# Patient Record
Sex: MALE | URBAN - METROPOLITAN AREA
[De-identification: names, ages, dates, MRNs, and addresses within clinical notes are randomized per-mention and may not be internally consistent; named-entity substitution may affect disease eponyms.]

---

## 2022-11-30 ENCOUNTER — OFFICE (OUTPATIENT)
Dept: URBAN - METROPOLITAN AREA CLINIC 105 | Facility: CLINIC | Age: 87
Setting detail: OPHTHALMOLOGY
End: 2022-11-30
Payer: COMMERCIAL

## 2022-11-30 DIAGNOSIS — H25.013: ICD-10-CM

## 2022-11-30 DIAGNOSIS — H40.1232: ICD-10-CM

## 2022-11-30 PROCEDURE — 92133 CPTRZD OPH DX IMG PST SGM ON: CPT | Performed by: OPHTHALMOLOGY

## 2022-11-30 PROCEDURE — 92012 INTRM OPH EXAM EST PATIENT: CPT | Performed by: OPHTHALMOLOGY

## 2022-11-30 ASSESSMENT — REFRACTION_AUTOREFRACTION
OD_AXIS: 081
OD_CYLINDER: -2.00
OD_SPHERE: +2.75
OS_AXIS: 095
OS_SPHERE: +1.50
OS_CYLINDER: -1.25

## 2022-11-30 ASSESSMENT — REFRACTION_CURRENTRX
OD_CYLINDER: -1.00
OS_SPHERE: +1.50
OD_VPRISM_DIRECTION: BF
OS_CYLINDER: -1.75
OD_OVR_VA: 20/
OS_VPRISM_DIRECTION: BF
OD_AXIS: 098
OS_ADD: +3.00
OD_SPHERE: +2.25
OS_OVR_VA: 20/
OD_ADD: +3.00
OS_AXIS: 097

## 2022-11-30 ASSESSMENT — CONFRONTATIONAL VISUAL FIELD TEST (CVF)
OS_FINDINGS: FULL
OD_FINDINGS: FULL

## 2022-11-30 ASSESSMENT — TONOMETRY
OS_IOP_MMHG: 11
OD_IOP_MMHG: 13

## 2022-11-30 ASSESSMENT — PACHYMETRY
OS_CT_CORRECTION: -4
OD_CT_CORRECTION: -2
OD_CT_UM: 570
OS_CT_UM: 590

## 2022-11-30 ASSESSMENT — SPHEQUIV_DERIVED
OD_SPHEQUIV: 1.75
OS_SPHEQUIV: 0.875

## 2022-11-30 ASSESSMENT — VISUAL ACUITY
OS_BCVA: 20/CF @2FT
OD_BCVA: 20/30-

## 2022-12-12 ENCOUNTER — OFFICE (OUTPATIENT)
Dept: URBAN - METROPOLITAN AREA CLINIC 105 | Facility: CLINIC | Age: 87
Setting detail: OPHTHALMOLOGY
End: 2022-12-12
Payer: COMMERCIAL

## 2022-12-12 DIAGNOSIS — H34.8110: ICD-10-CM

## 2022-12-12 PROCEDURE — 92134 CPTRZ OPH DX IMG PST SGM RTA: CPT | Performed by: OPHTHALMOLOGY

## 2022-12-12 PROCEDURE — 67210 TREATMENT OF RETINAL LESION: CPT | Performed by: OPHTHALMOLOGY

## 2022-12-12 ASSESSMENT — VISUAL ACUITY
OD_BCVA: 20/30-1
OS_BCVA: 20/CF @2FT

## 2022-12-12 ASSESSMENT — SPHEQUIV_DERIVED
OD_SPHEQUIV: 1.75
OS_SPHEQUIV: 0.875

## 2022-12-12 ASSESSMENT — KERATOMETRY
OD_K2POWER_DIOPTERS: 41.00
OS_K1POWER_DIOPTERS: 40.75
OS_AXISANGLE_DEGREES: 090
OD_K1POWER_DIOPTERS: 40.25
OD_AXISANGLE_DEGREES: 005
OS_K2POWER_DIOPTERS: 40.75

## 2022-12-12 ASSESSMENT — REFRACTION_CURRENTRX
OD_SPHERE: +2.25
OS_AXIS: 097
OS_ADD: +3.00
OS_OVR_VA: 20/
OS_VPRISM_DIRECTION: BF
OD_OVR_VA: 20/
OS_CYLINDER: -1.75
OD_VPRISM_DIRECTION: BF
OD_CYLINDER: -1.00
OS_SPHERE: +1.50
OD_AXIS: 098
OD_ADD: +3.00

## 2022-12-12 ASSESSMENT — REFRACTION_AUTOREFRACTION
OS_SPHERE: +1.50
OS_CYLINDER: -1.25
OD_AXIS: 081
OS_AXIS: 095
OD_CYLINDER: -2.00
OD_SPHERE: +2.75

## 2022-12-12 ASSESSMENT — CONFRONTATIONAL VISUAL FIELD TEST (CVF)
OD_FINDINGS: FULL
OS_FINDINGS: FULL

## 2022-12-12 ASSESSMENT — AXIALLENGTH_DERIVED
OD_AL: 23.9721
OS_AL: 24.2806

## 2023-01-09 ENCOUNTER — OFFICE (OUTPATIENT)
Dept: URBAN - METROPOLITAN AREA CLINIC 105 | Facility: CLINIC | Age: 88
Setting detail: OPHTHALMOLOGY
End: 2023-01-09
Payer: COMMERCIAL

## 2023-01-09 DIAGNOSIS — H34.8110: ICD-10-CM

## 2023-01-09 PROCEDURE — 67028 INJECTION EYE DRUG: CPT | Performed by: OPHTHALMOLOGY

## 2023-01-09 PROCEDURE — 92134 CPTRZ OPH DX IMG PST SGM RTA: CPT | Performed by: OPHTHALMOLOGY

## 2023-01-09 ASSESSMENT — REFRACTION_CURRENTRX
OS_ADD: +3.00
OS_SPHERE: +1.50
OD_AXIS: 098
OS_VPRISM_DIRECTION: BF
OS_AXIS: 097
OD_ADD: +3.00
OD_OVR_VA: 20/
OD_VPRISM_DIRECTION: BF
OS_OVR_VA: 20/
OD_SPHERE: +2.25
OD_CYLINDER: -1.00
OS_CYLINDER: -1.75

## 2023-01-09 ASSESSMENT — KERATOMETRY
OD_K2POWER_DIOPTERS: 41.00
OS_K2POWER_DIOPTERS: 40.75
OS_K1POWER_DIOPTERS: 40.75
OS_AXISANGLE_DEGREES: 090
OD_K1POWER_DIOPTERS: 40.25
OD_AXISANGLE_DEGREES: 005

## 2023-01-09 ASSESSMENT — VISUAL ACUITY
OD_BCVA: 20/40-1
OS_BCVA: 20/CF @2FT

## 2023-01-09 ASSESSMENT — CONFRONTATIONAL VISUAL FIELD TEST (CVF)
OS_FINDINGS: FULL
OD_FINDINGS: FULL

## 2023-01-09 ASSESSMENT — REFRACTION_AUTOREFRACTION
OD_AXIS: 081
OS_CYLINDER: -1.25
OD_SPHERE: +2.75
OS_AXIS: 095
OS_SPHERE: +1.50
OD_CYLINDER: -2.00

## 2023-01-09 ASSESSMENT — AXIALLENGTH_DERIVED
OD_AL: 23.9721
OS_AL: 24.2806

## 2023-01-09 ASSESSMENT — SPHEQUIV_DERIVED
OD_SPHEQUIV: 1.75
OS_SPHEQUIV: 0.875

## 2023-03-13 ENCOUNTER — OFFICE (OUTPATIENT)
Dept: URBAN - METROPOLITAN AREA CLINIC 105 | Facility: CLINIC | Age: 88
Setting detail: OPHTHALMOLOGY
End: 2023-03-13
Payer: COMMERCIAL

## 2023-03-13 DIAGNOSIS — H34.8110: ICD-10-CM

## 2023-03-13 PROCEDURE — 92134 CPTRZ OPH DX IMG PST SGM RTA: CPT | Performed by: OPHTHALMOLOGY

## 2023-03-13 PROCEDURE — 67028 INJECTION EYE DRUG: CPT | Performed by: OPHTHALMOLOGY

## 2023-03-13 ASSESSMENT — AXIALLENGTH_DERIVED
OS_AL: 24.2806
OD_AL: 23.9721

## 2023-03-13 ASSESSMENT — KERATOMETRY
OS_K2POWER_DIOPTERS: 40.75
OS_AXISANGLE_DEGREES: 090
OD_AXISANGLE_DEGREES: 005
OS_K1POWER_DIOPTERS: 40.75
OD_K2POWER_DIOPTERS: 41.00
OD_K1POWER_DIOPTERS: 40.25

## 2023-03-13 ASSESSMENT — REFRACTION_CURRENTRX
OD_ADD: +3.00
OS_SPHERE: +1.50
OS_CYLINDER: -1.75
OD_AXIS: 098
OS_VPRISM_DIRECTION: BF
OS_ADD: +3.00
OD_CYLINDER: -1.00
OD_VPRISM_DIRECTION: BF
OS_OVR_VA: 20/
OD_SPHERE: +2.25
OS_AXIS: 097
OD_OVR_VA: 20/

## 2023-03-13 ASSESSMENT — CONFRONTATIONAL VISUAL FIELD TEST (CVF)
OS_FINDINGS: FULL
OD_FINDINGS: FULL

## 2023-03-13 ASSESSMENT — SPHEQUIV_DERIVED
OS_SPHEQUIV: 0.875
OD_SPHEQUIV: 1.75

## 2023-03-13 ASSESSMENT — VISUAL ACUITY
OD_BCVA: 20/50-1
OS_BCVA: 20/CF @2FT

## 2023-03-13 ASSESSMENT — REFRACTION_AUTOREFRACTION
OS_AXIS: 095
OS_CYLINDER: -1.25
OS_SPHERE: +1.50
OD_SPHERE: +2.75
OD_CYLINDER: -2.00
OD_AXIS: 081

## 2023-03-27 ENCOUNTER — OFFICE (OUTPATIENT)
Dept: URBAN - METROPOLITAN AREA CLINIC 105 | Facility: CLINIC | Age: 88
Setting detail: OPHTHALMOLOGY
End: 2023-03-27
Payer: COMMERCIAL

## 2023-03-27 DIAGNOSIS — H34.8110: ICD-10-CM

## 2023-03-27 PROCEDURE — 67210 TREATMENT OF RETINAL LESION: CPT | Performed by: OPHTHALMOLOGY

## 2023-03-27 ASSESSMENT — SPHEQUIV_DERIVED
OS_SPHEQUIV: 0.875
OD_SPHEQUIV: 1.75

## 2023-03-27 ASSESSMENT — REFRACTION_AUTOREFRACTION
OD_SPHERE: +2.75
OD_CYLINDER: -2.00
OS_SPHERE: +1.50
OS_CYLINDER: -1.25
OD_AXIS: 081
OS_AXIS: 095

## 2023-03-27 ASSESSMENT — KERATOMETRY
OS_AXISANGLE_DEGREES: 090
OS_K2POWER_DIOPTERS: 40.75
OD_AXISANGLE_DEGREES: 005
OD_K1POWER_DIOPTERS: 40.25
OD_K2POWER_DIOPTERS: 41.00
OS_K1POWER_DIOPTERS: 40.75

## 2023-03-27 ASSESSMENT — VISUAL ACUITY
OD_BCVA: 20/40-1
OS_BCVA: 20/CF @2FT

## 2023-03-27 ASSESSMENT — AXIALLENGTH_DERIVED
OS_AL: 24.2806
OD_AL: 23.9721

## 2023-03-27 ASSESSMENT — CONFRONTATIONAL VISUAL FIELD TEST (CVF)
OD_FINDINGS: FULL
OS_FINDINGS: FULL

## 2023-05-22 ENCOUNTER — OFFICE (OUTPATIENT)
Dept: URBAN - METROPOLITAN AREA CLINIC 105 | Facility: CLINIC | Age: 88
Setting detail: OPHTHALMOLOGY
End: 2023-05-22
Payer: MEDICARE

## 2023-05-22 DIAGNOSIS — H34.8110: ICD-10-CM

## 2023-05-22 PROCEDURE — 92134 CPTRZ OPH DX IMG PST SGM RTA: CPT | Performed by: OPHTHALMOLOGY

## 2023-05-22 PROCEDURE — 67028 INJECTION EYE DRUG: CPT | Performed by: OPHTHALMOLOGY

## 2023-05-22 ASSESSMENT — KERATOMETRY
OD_K1POWER_DIOPTERS: 40.25
OS_K2POWER_DIOPTERS: 40.75
OD_AXISANGLE_DEGREES: 005
OD_K2POWER_DIOPTERS: 41.00
OS_AXISANGLE_DEGREES: 090
OS_K1POWER_DIOPTERS: 40.75

## 2023-05-22 ASSESSMENT — SPHEQUIV_DERIVED
OD_SPHEQUIV: 1.75
OS_SPHEQUIV: 0.875

## 2023-05-22 ASSESSMENT — VISUAL ACUITY
OD_BCVA: 20/40-1
OS_BCVA: CF @2FT

## 2023-05-22 ASSESSMENT — REFRACTION_AUTOREFRACTION
OS_SPHERE: +1.50
OD_AXIS: 081
OD_SPHERE: +2.75
OS_AXIS: 095
OD_CYLINDER: -2.00
OS_CYLINDER: -1.25

## 2023-05-22 ASSESSMENT — CONFRONTATIONAL VISUAL FIELD TEST (CVF)
OS_FINDINGS: FULL
OD_FINDINGS: FULL

## 2023-05-22 ASSESSMENT — AXIALLENGTH_DERIVED
OD_AL: 23.9721
OS_AL: 24.2806

## 2023-06-08 ENCOUNTER — OFFICE (OUTPATIENT)
Dept: URBAN - METROPOLITAN AREA CLINIC 105 | Facility: CLINIC | Age: 88
Setting detail: OPHTHALMOLOGY
End: 2023-06-08
Payer: MEDICARE

## 2023-06-08 DIAGNOSIS — H25.013: ICD-10-CM

## 2023-06-08 DIAGNOSIS — H40.1232: ICD-10-CM

## 2023-06-08 DIAGNOSIS — H40.033: ICD-10-CM

## 2023-06-08 PROCEDURE — 99024 POSTOP FOLLOW-UP VISIT: CPT | Performed by: REGISTERED NURSE

## 2023-06-08 PROCEDURE — 92133 CPTRZD OPH DX IMG PST SGM ON: CPT | Performed by: REGISTERED NURSE

## 2023-06-08 ASSESSMENT — KERATOMETRY
OD_K1POWER_DIOPTERS: 40.75
OS_K2POWER_DIOPTERS: 41.50
OD_K2POWER_DIOPTERS: 41.25
OD_AXISANGLE_DEGREES: 171
OS_AXISANGLE_DEGREES: 089
OS_K1POWER_DIOPTERS: 40.75

## 2023-06-08 ASSESSMENT — VISUAL ACUITY
OD_BCVA: 20/50-2
OS_BCVA: CF @2FT

## 2023-06-08 ASSESSMENT — SPHEQUIV_DERIVED
OD_SPHEQUIV: 2
OS_SPHEQUIV: 0.875

## 2023-06-08 ASSESSMENT — REFRACTION_AUTOREFRACTION
OS_AXIS: 096
OD_SPHERE: +2.75
OD_AXIS: 007
OD_CYLINDER: -1.50
OS_SPHERE: +1.50
OS_CYLINDER: -1.25

## 2023-06-08 ASSESSMENT — PACHYMETRY
OS_CT_CORRECTION: -4
OD_CT_CORRECTION: -2
OS_CT_UM: 590
OD_CT_UM: 570

## 2023-06-08 ASSESSMENT — AXIALLENGTH_DERIVED
OS_AL: 24.1356
OD_AL: 23.7318

## 2023-06-08 ASSESSMENT — TONOMETRY
OD_IOP_MMHG: 16
OS_IOP_MMHG: 14
OD_IOP_MMHG: 10
OS_IOP_MMHG: 09

## 2023-06-08 ASSESSMENT — CONFRONTATIONAL VISUAL FIELD TEST (CVF)
OD_FINDINGS: FULL
OS_FINDINGS: FULL

## 2023-06-16 ENCOUNTER — OFFICE (OUTPATIENT)
Dept: URBAN - METROPOLITAN AREA CLINIC 94 | Facility: CLINIC | Age: 88
Setting detail: OPHTHALMOLOGY
End: 2023-06-16
Payer: MEDICARE

## 2023-06-16 DIAGNOSIS — H25.012: ICD-10-CM

## 2023-06-16 DIAGNOSIS — H25.013: ICD-10-CM

## 2023-06-16 PROCEDURE — 99213 OFFICE O/P EST LOW 20 MIN: CPT | Performed by: OPHTHALMOLOGY

## 2023-06-16 PROCEDURE — 92136 OPHTHALMIC BIOMETRY: CPT | Performed by: OPHTHALMOLOGY

## 2023-06-16 ASSESSMENT — KERATOMETRY
OS_AXISANGLE_DEGREES: 156
OS_CYLPOWER_DEGREES: 0.5
OD_AXISANGLE_DEGREES: 169
OS_K1K2_AVERAGE: 40.5
OD_CYLAXISANGLE_DEGREES: 169
OS_K1POWER_DIOPTERS: 40.25
OD_K1K2_AVERAGE: 40.625
OD_AXISANGLE_DEGREES: 79
OD_K1POWER_DIOPTERS: 40.25
OD_K2POWER_DIOPTERS: 41.00
OD_K1POWER_DIOPTERS: 40.25
OD_AXISANGLE2_DEGREES: 169
OS_K2POWER_DIOPTERS: 40.75
OS_K1POWER_DIOPTERS: 40.25
OD_CYLPOWER_DEGREES: 0.75
OS_AXISANGLE2_DEGREES: 156
OS_K2POWER_DIOPTERS: 40.75
OD_K2POWER_DIOPTERS: 41.00
OS_CYLAXISANGLE_DEGREES: 156
OS_AXISANGLE_DEGREES: 66

## 2023-06-16 ASSESSMENT — PACHYMETRY
OD_CT_CORRECTION: -2
OD_CT_UM: 570
OS_CT_CORRECTION: -4
OS_CT_UM: 590

## 2023-06-16 ASSESSMENT — TONOMETRY
OS_IOP_MMHG: 16
OD_IOP_MMHG: 12

## 2023-06-16 ASSESSMENT — REFRACTION_AUTOREFRACTION
OS_SPHERE: +1.75
OD_SPHERE: +2.75
OS_AXIS: 098
OD_CYLINDER: -1.75
OS_CYLINDER: -1.25
OD_AXIS: 084

## 2023-06-16 ASSESSMENT — AXIALLENGTH_DERIVED
OD_AL: 23.922
OS_AL: 24.2747

## 2023-06-16 ASSESSMENT — CONFRONTATIONAL VISUAL FIELD TEST (CVF)
OS_FINDINGS: FULL
OD_FINDINGS: FULL

## 2023-06-16 ASSESSMENT — SPHEQUIV_DERIVED
OD_SPHEQUIV: 1.875
OS_SPHEQUIV: 1.125

## 2023-06-16 ASSESSMENT — VISUAL ACUITY
OS_BCVA: CF@5FT
OD_BCVA: 20/40-1

## 2023-08-03 ENCOUNTER — ASC (OUTPATIENT)
Dept: URBAN - METROPOLITAN AREA SURGERY 8 | Facility: SURGERY | Age: 88
Setting detail: OPHTHALMOLOGY
End: 2023-08-03
Payer: MEDICARE

## 2023-08-03 DIAGNOSIS — H52.212: ICD-10-CM

## 2023-08-03 DIAGNOSIS — H25.12: ICD-10-CM

## 2023-08-03 PROCEDURE — FEMTO FEMTOSECOND LASER: Performed by: OPHTHALMOLOGY

## 2023-08-03 PROCEDURE — 66984 XCAPSL CTRC RMVL W/O ECP: CPT | Performed by: OPHTHALMOLOGY

## 2023-08-04 ENCOUNTER — RX ONLY (RX ONLY)
Age: 88
End: 2023-08-04

## 2023-08-04 ENCOUNTER — OFFICE (OUTPATIENT)
Dept: URBAN - METROPOLITAN AREA CLINIC 94 | Facility: CLINIC | Age: 88
Setting detail: OPHTHALMOLOGY
End: 2023-08-04
Payer: MEDICARE

## 2023-08-04 DIAGNOSIS — Z96.1: ICD-10-CM

## 2023-08-04 DIAGNOSIS — H25.011: ICD-10-CM

## 2023-08-04 DIAGNOSIS — H25.013: ICD-10-CM

## 2023-08-04 DIAGNOSIS — H25.012: ICD-10-CM

## 2023-08-04 PROCEDURE — 99024 POSTOP FOLLOW-UP VISIT: CPT | Performed by: REGISTERED NURSE

## 2023-08-04 ASSESSMENT — PACHYMETRY
OD_CT_CORRECTION: -2
OD_CT_UM: 570
OS_CT_CORRECTION: -4
OS_CT_UM: 590

## 2023-08-04 ASSESSMENT — REFRACTION_AUTOREFRACTION
OD_SPHERE: +3.00
OS_CYLINDER: -1.75
OS_SPHERE: +0.25
OD_CYLINDER: -2.00
OS_AXIS: 143
OD_AXIS: 081

## 2023-08-04 ASSESSMENT — VISUAL ACUITY
OS_BCVA: 20/400
OD_BCVA: 20/50+1

## 2023-08-04 ASSESSMENT — KERATOMETRY
OS_AXISANGLE_DEGREES: 067
OD_K2POWER_DIOPTERS: 41.00
OS_K1POWER_DIOPTERS: 40.50
OD_K1POWER_DIOPTERS: 40.50
OS_K2POWER_DIOPTERS: 42.00
OD_AXISANGLE_DEGREES: 166

## 2023-08-04 ASSESSMENT — CONFRONTATIONAL VISUAL FIELD TEST (CVF)
OS_FINDINGS: FULL
OD_FINDINGS: FULL

## 2023-08-04 ASSESSMENT — SPHEQUIV_DERIVED
OD_SPHEQUIV: 2
OS_SPHEQUIV: -0.625

## 2023-08-04 ASSESSMENT — TONOMETRY
OS_IOP_MMHG: 15
OD_IOP_MMHG: 12

## 2023-08-04 ASSESSMENT — AXIALLENGTH_DERIVED
OS_AL: 24.7127
OD_AL: 23.8251

## 2023-08-04 ASSESSMENT — CORNEAL EDEMA - FOLDS/STRIAE: OS_FOLDSSTRIAE: 1+

## 2023-08-10 ENCOUNTER — OFFICE (OUTPATIENT)
Dept: URBAN - METROPOLITAN AREA CLINIC 105 | Facility: CLINIC | Age: 88
Setting detail: OPHTHALMOLOGY
End: 2023-08-10
Payer: MEDICARE

## 2023-08-10 DIAGNOSIS — Z96.1: ICD-10-CM

## 2023-08-10 PROCEDURE — 99024 POSTOP FOLLOW-UP VISIT: CPT | Performed by: REGISTERED NURSE

## 2023-08-10 ASSESSMENT — REFRACTION_AUTOREFRACTION
OS_AXIS: 157
OD_SPHERE: +3.25
OS_CYLINDER: -0.25
OD_CYLINDER: -2.25
OS_SPHERE: +0.25
OD_AXIS: 078

## 2023-08-10 ASSESSMENT — PACHYMETRY
OS_CT_UM: 590
OS_CT_CORRECTION: -4
OD_CT_CORRECTION: -2
OD_CT_UM: 570

## 2023-08-10 ASSESSMENT — VISUAL ACUITY
OS_BCVA: 20/400
OD_BCVA: 20/30-1

## 2023-08-10 ASSESSMENT — KERATOMETRY
OS_K2POWER_DIOPTERS: 42.50
OD_AXISANGLE_DEGREES: 176
OS_AXISANGLE_DEGREES: 086
OD_K2POWER_DIOPTERS: 41.25
OS_K1POWER_DIOPTERS: 41.00
OD_K1POWER_DIOPTERS: 40.75

## 2023-08-10 ASSESSMENT — AXIALLENGTH_DERIVED
OS_AL: 24.2013
OD_AL: 23.6826

## 2023-08-10 ASSESSMENT — CONFRONTATIONAL VISUAL FIELD TEST (CVF)
OS_FINDINGS: FULL
OD_FINDINGS: FULL

## 2023-08-10 ASSESSMENT — TONOMETRY
OS_IOP_MMHG: 18
OD_IOP_MMHG: 15

## 2023-08-10 ASSESSMENT — SPHEQUIV_DERIVED
OD_SPHEQUIV: 2.125
OS_SPHEQUIV: 0.125

## 2023-08-10 ASSESSMENT — CORNEAL EDEMA - FOLDS/STRIAE: OS_FOLDSSTRIAE: 1+

## 2023-08-28 ENCOUNTER — OFFICE (OUTPATIENT)
Dept: URBAN - METROPOLITAN AREA CLINIC 105 | Facility: CLINIC | Age: 88
Setting detail: OPHTHALMOLOGY
End: 2023-08-28
Payer: MEDICARE

## 2023-08-28 DIAGNOSIS — H34.8110: ICD-10-CM

## 2023-08-28 PROCEDURE — 92235 FLUORESCEIN ANGRPH MLTIFRAME: CPT | Performed by: OPHTHALMOLOGY

## 2023-08-28 PROCEDURE — 67028 INJECTION EYE DRUG: CPT | Performed by: OPHTHALMOLOGY

## 2023-08-28 PROCEDURE — 92134 CPTRZ OPH DX IMG PST SGM RTA: CPT | Performed by: OPHTHALMOLOGY

## 2023-08-28 ASSESSMENT — REFRACTION_AUTOREFRACTION
OS_CYLINDER: -0.25
OS_AXIS: 157
OD_AXIS: 078
OD_SPHERE: +3.25
OS_SPHERE: +0.25
OD_CYLINDER: -2.25

## 2023-08-28 ASSESSMENT — KERATOMETRY
OD_K2POWER_DIOPTERS: 41.25
OD_AXISANGLE_DEGREES: 176
OD_K1POWER_DIOPTERS: 40.75
OS_AXISANGLE_DEGREES: 086
OS_K2POWER_DIOPTERS: 42.50
OS_K1POWER_DIOPTERS: 41.00

## 2023-08-28 ASSESSMENT — CONFRONTATIONAL VISUAL FIELD TEST (CVF)
OS_FINDINGS: FULL
OD_FINDINGS: FULL

## 2023-08-28 ASSESSMENT — PACHYMETRY
OD_CT_UM: 570
OS_CT_CORRECTION: -4
OD_CT_CORRECTION: -2
OS_CT_UM: 590

## 2023-08-28 ASSESSMENT — TONOMETRY
OD_IOP_MMHG: 16
OS_IOP_MMHG: 14

## 2023-08-28 ASSESSMENT — VISUAL ACUITY
OS_BCVA: CF 5FT
OD_BCVA: 20/50-2

## 2023-08-28 ASSESSMENT — SPHEQUIV_DERIVED
OD_SPHEQUIV: 2.125
OS_SPHEQUIV: 0.125

## 2023-08-28 ASSESSMENT — AXIALLENGTH_DERIVED
OD_AL: 23.6826
OS_AL: 24.2013

## 2023-08-28 ASSESSMENT — CORNEAL EDEMA - FOLDS/STRIAE: OS_FOLDSSTRIAE: 1+

## 2023-09-06 ENCOUNTER — OFFICE (OUTPATIENT)
Dept: URBAN - METROPOLITAN AREA CLINIC 113 | Facility: CLINIC | Age: 88
Setting detail: OPHTHALMOLOGY
End: 2023-09-06
Payer: MEDICARE

## 2023-09-06 DIAGNOSIS — H26.492: ICD-10-CM

## 2023-09-06 DIAGNOSIS — Z96.1: ICD-10-CM

## 2023-09-06 PROCEDURE — 99024 POSTOP FOLLOW-UP VISIT: CPT | Performed by: OPHTHALMOLOGY

## 2023-09-06 ASSESSMENT — KERATOMETRY
OS_AXISANGLE_DEGREES: 077
OS_K1POWER_DIOPTERS: 40.00
OD_AXISANGLE_DEGREES: 166
OD_K2POWER_DIOPTERS: 40.75
OS_K2POWER_DIOPTERS: 41.75
OD_K1POWER_DIOPTERS: 40.50

## 2023-09-06 ASSESSMENT — VISUAL ACUITY
OD_BCVA: 20/60
OS_BCVA: CF 4FT

## 2023-09-06 ASSESSMENT — PACHYMETRY
OD_CT_UM: 570
OS_CT_CORRECTION: -4
OS_CT_UM: 590
OD_CT_CORRECTION: -2

## 2023-09-06 ASSESSMENT — REFRACTION_MANIFEST
OS_ADD: +2.75
OS_VA1: 20/60
OS_CYLINDER: -0.25
OS_SPHERE: PLANO
OS_VA2: 20/70(J7)
OS_AXIS: 135

## 2023-09-06 ASSESSMENT — TONOMETRY
OS_IOP_MMHG: 11
OD_IOP_MMHG: 16
OD_IOP_MMHG: 14
OS_IOP_MMHG: 12

## 2023-09-06 ASSESSMENT — REFRACTION_CURRENTRX
OD_ADD: +3.25
OS_CYLINDER: -1.75
OD_SPHERE: +2.25
OD_VPRISM_DIRECTION: SV
OD_CYLINDER: -1.00
OD_SPHERE: +2.75
OS_SPHERE: +1.50
OS_VPRISM_DIRECTION: SV
OS_SPHERE: +2.75
OD_AXIS: 101
OD_OVR_VA: 20/
OS_VPRISM_DIRECTION: BF
OD_VPRISM_DIRECTION: BF
OS_AXIS: 096
OS_ADD: +3.25
OD_OVR_VA: 20/
OS_OVR_VA: 20/
OS_OVR_VA: 20/

## 2023-09-06 ASSESSMENT — CONFRONTATIONAL VISUAL FIELD TEST (CVF)
OS_FINDINGS: FULL
OD_FINDINGS: FULL

## 2023-09-06 ASSESSMENT — REFRACTION_AUTOREFRACTION
OS_SPHERE: PLANO
OD_CYLINDER: -1.50
OD_AXIS: 090
OS_AXIS: 138
OD_SPHERE: +2.25
OS_CYLINDER: -0.50

## 2023-09-06 ASSESSMENT — AXIALLENGTH_DERIVED: OD_AL: 24.0731

## 2023-09-06 ASSESSMENT — CORNEAL EDEMA - FOLDS/STRIAE: OS_FOLDSSTRIAE: 1+

## 2023-09-06 ASSESSMENT — SPHEQUIV_DERIVED: OD_SPHEQUIV: 1.5

## 2023-10-24 ENCOUNTER — OFFICE (OUTPATIENT)
Dept: URBAN - METROPOLITAN AREA CLINIC 105 | Facility: CLINIC | Age: 88
Setting detail: OPHTHALMOLOGY
End: 2023-10-24
Payer: MEDICARE

## 2023-10-24 DIAGNOSIS — H26.492: ICD-10-CM

## 2023-10-24 DIAGNOSIS — Z96.1: ICD-10-CM

## 2023-10-24 PROCEDURE — 99024 POSTOP FOLLOW-UP VISIT: CPT | Performed by: OPTOMETRIST

## 2023-10-24 ASSESSMENT — AXIALLENGTH_DERIVED
OS_AL: 24.1955
OD_AL: 24.0254

## 2023-10-24 ASSESSMENT — REFRACTION_CURRENTRX
OS_SPHERE: +1.50
OS_OVR_VA: 20/
OS_VPRISM_DIRECTION: BF
OD_OVR_VA: 20/
OS_SPHERE: +1.50
OD_AXIS: 104
OS_ADD: +3.25
OD_ADD: +3.25
OS_ADD: +3.25
OD_ADD: +3.25
OD_AXIS: 101
OS_AXIS: 095
OD_CYLINDER: -1.00
OD_VPRISM_DIRECTION: BF
OD_OVR_VA: 20/
OS_OVR_VA: 20/
OS_CYLINDER: -1.75
OD_CYLINDER: -1.00
OD_VPRISM_DIRECTION: BF
OD_SPHERE: +2.25
OS_CYLINDER: -1.75
OS_VPRISM_DIRECTION: BF
OD_SPHERE: +2.00
OS_AXIS: 096

## 2023-10-24 ASSESSMENT — REFRACTION_MANIFEST
OS_SPHERE: +0.50
OS_ADD: +2.75
OD_VA1: 20/NI
OS_VA2: 20/70(J7)
OD_SPHERE: BALANCE
OS_VA1: 20/30
OS_CYLINDER: SPH

## 2023-10-24 ASSESSMENT — REFRACTION_AUTOREFRACTION
OS_AXIS: 122
OD_CYLINDER: -2.00
OS_CYLINDER: -0.25
OD_AXIS: 082
OS_SPHERE: +0.50
OD_SPHERE: +2.50

## 2023-10-24 ASSESSMENT — CONFRONTATIONAL VISUAL FIELD TEST (CVF)
OD_FINDINGS: FULL
OS_FINDINGS: FULL

## 2023-10-24 ASSESSMENT — PACHYMETRY
OS_CT_CORRECTION: -4
OS_CT_UM: 590
OD_CT_CORRECTION: -2
OD_CT_UM: 570

## 2023-10-24 ASSESSMENT — TONOMETRY
OD_IOP_MMHG: 17
OS_IOP_MMHG: 14

## 2023-10-24 ASSESSMENT — KERATOMETRY
OS_K2POWER_DIOPTERS: 42.25
OD_K2POWER_DIOPTERS: 41.00
OS_AXISANGLE_DEGREES: 079
OD_K1POWER_DIOPTERS: 40.50
OD_AXISANGLE_DEGREES: 169
OS_K1POWER_DIOPTERS: 40.75

## 2023-10-24 ASSESSMENT — VISUAL ACUITY
OS_BCVA: CF 2FT
OD_BCVA: 20/40-

## 2023-10-24 ASSESSMENT — SPHEQUIV_DERIVED
OD_SPHEQUIV: 1.5
OS_SPHEQUIV: 0.375

## 2023-10-24 ASSESSMENT — CORNEAL EDEMA CLINICAL DESCRIPTION: OS_CORNEALEDEMA: ABSENT

## 2023-11-28 ENCOUNTER — NON-APPOINTMENT (OUTPATIENT)
Age: 88
End: 2023-11-28

## 2023-11-28 ENCOUNTER — APPOINTMENT (OUTPATIENT)
Dept: CARDIOLOGY | Facility: CLINIC | Age: 88
End: 2023-11-28
Payer: MEDICARE

## 2023-11-28 VITALS
OXYGEN SATURATION: 96 % | BODY MASS INDEX: 21.67 KG/M2 | HEIGHT: 68 IN | DIASTOLIC BLOOD PRESSURE: 70 MMHG | SYSTOLIC BLOOD PRESSURE: 134 MMHG | WEIGHT: 143 LBS | HEART RATE: 70 BPM

## 2023-11-28 VITALS — DIASTOLIC BLOOD PRESSURE: 70 MMHG | SYSTOLIC BLOOD PRESSURE: 144 MMHG

## 2023-11-28 DIAGNOSIS — R94.31 ABNORMAL ELECTROCARDIOGRAM [ECG] [EKG]: ICD-10-CM

## 2023-11-28 DIAGNOSIS — E11.319 TYPE 2 DIABETES MELLITUS WITH UNSPECIFIED DIABETIC RETINOPATHY W/OUT MACULAR EDEMA: ICD-10-CM

## 2023-11-28 DIAGNOSIS — Z87.891 PERSONAL HISTORY OF NICOTINE DEPENDENCE: ICD-10-CM

## 2023-11-28 DIAGNOSIS — Z82.49 FAMILY HISTORY OF ISCHEMIC HEART DISEASE AND OTHER DISEASES OF THE CIRCULATORY SYSTEM: ICD-10-CM

## 2023-11-28 DIAGNOSIS — Z82.3 FAMILY HISTORY OF STROKE: ICD-10-CM

## 2023-11-28 DIAGNOSIS — Z83.438 FAMILY HISTORY OF OTHER DISORDER OF LIPOPROTEIN METABOLISM AND OTHER LIPIDEMIA: ICD-10-CM

## 2023-11-28 PROBLEM — Z00.00 ENCOUNTER FOR PREVENTIVE HEALTH EXAMINATION: Status: ACTIVE | Noted: 2023-11-28

## 2023-11-28 PROCEDURE — 99204 OFFICE O/P NEW MOD 45 MIN: CPT

## 2023-11-28 PROCEDURE — 93000 ELECTROCARDIOGRAM COMPLETE: CPT

## 2023-11-28 RX ORDER — CHLORHEXIDINE GLUCONATE 4 %
325 (65 FE) LIQUID (ML) TOPICAL
Refills: 0 | Status: ACTIVE | COMMUNITY

## 2023-11-28 RX ORDER — FOSINOPRIL SODIUM 40 MG/1
40 TABLET ORAL DAILY
Refills: 0 | Status: ACTIVE | COMMUNITY

## 2023-11-28 RX ORDER — ATORVASTATIN CALCIUM 20 MG/1
20 TABLET, FILM COATED ORAL DAILY
Refills: 0 | Status: ACTIVE | COMMUNITY

## 2023-11-28 RX ORDER — METFORMIN HYDROCHLORIDE 500 MG/1
500 TABLET, FILM COATED, EXTENDED RELEASE ORAL
Refills: 0 | Status: ACTIVE | COMMUNITY

## 2023-11-28 RX ORDER — DILTIAZEM HYDROCHLORIDE 180 MG/1
180 CAPSULE, EXTENDED RELEASE ORAL DAILY
Refills: 0 | Status: ACTIVE | COMMUNITY

## 2023-11-28 RX ORDER — METOPROLOL TARTRATE 50 MG/1
50 TABLET, FILM COATED ORAL
Refills: 0 | Status: ACTIVE | COMMUNITY

## 2023-12-08 ENCOUNTER — OFFICE (OUTPATIENT)
Dept: URBAN - METROPOLITAN AREA CLINIC 105 | Facility: CLINIC | Age: 88
Setting detail: OPHTHALMOLOGY
End: 2023-12-08
Payer: MEDICARE

## 2023-12-08 DIAGNOSIS — H34.8130: ICD-10-CM

## 2023-12-08 DIAGNOSIS — H34.8110: ICD-10-CM

## 2023-12-08 DIAGNOSIS — H34.8120: ICD-10-CM

## 2023-12-08 PROCEDURE — 92235 FLUORESCEIN ANGRPH MLTIFRAME: CPT | Performed by: OPHTHALMOLOGY

## 2023-12-08 PROCEDURE — 67028 INJECTION EYE DRUG: CPT | Mod: 50 | Performed by: OPHTHALMOLOGY

## 2023-12-08 PROCEDURE — 92134 CPTRZ OPH DX IMG PST SGM RTA: CPT | Performed by: OPHTHALMOLOGY

## 2023-12-08 ASSESSMENT — REFRACTION_CURRENTRX
OD_ADD: +3.25
OD_CYLINDER: -1.00
OS_OVR_VA: 20/
OS_AXIS: 096
OD_VPRISM_DIRECTION: BF
OS_SPHERE: +1.50
OD_VPRISM_DIRECTION: BF
OD_OVR_VA: 20/
OS_CYLINDER: -1.75
OS_AXIS: 095
OD_CYLINDER: -1.00
OS_VPRISM_DIRECTION: BF
OS_ADD: +3.25
OD_SPHERE: +2.25
OD_OVR_VA: 20/
OS_SPHERE: +1.50
OD_SPHERE: +2.00
OS_OVR_VA: 20/
OS_VPRISM_DIRECTION: BF
OS_ADD: +3.25
OD_ADD: +3.25
OD_AXIS: 101
OS_CYLINDER: -1.75
OD_AXIS: 104

## 2023-12-08 ASSESSMENT — REFRACTION_AUTOREFRACTION
OD_CYLINDER: -2.00
OD_SPHERE: +2.50
OS_AXIS: 122
OS_SPHERE: +0.50
OS_CYLINDER: -0.25
OD_AXIS: 082

## 2023-12-08 ASSESSMENT — CORNEAL EDEMA CLINICAL DESCRIPTION: OS_CORNEALEDEMA: ABSENT

## 2023-12-08 ASSESSMENT — CONFRONTATIONAL VISUAL FIELD TEST (CVF)
OS_FINDINGS: FULL
OD_FINDINGS: FULL

## 2023-12-08 ASSESSMENT — SPHEQUIV_DERIVED
OD_SPHEQUIV: 1.5
OS_SPHEQUIV: 0.375

## 2023-12-28 ENCOUNTER — APPOINTMENT (OUTPATIENT)
Dept: CARDIOLOGY | Facility: CLINIC | Age: 88
End: 2023-12-28
Payer: MEDICARE

## 2023-12-28 PROCEDURE — 93306 TTE W/DOPPLER COMPLETE: CPT

## 2024-01-30 ENCOUNTER — APPOINTMENT (OUTPATIENT)
Dept: CARDIOLOGY | Facility: CLINIC | Age: 89
End: 2024-01-30

## 2024-02-09 ENCOUNTER — APPOINTMENT (OUTPATIENT)
Dept: CARDIOLOGY | Facility: CLINIC | Age: 89
End: 2024-02-09
Payer: MEDICARE

## 2024-02-09 VITALS
BODY MASS INDEX: 24.11 KG/M2 | WEIGHT: 150 LBS | SYSTOLIC BLOOD PRESSURE: 128 MMHG | DIASTOLIC BLOOD PRESSURE: 60 MMHG | HEART RATE: 52 BPM | HEIGHT: 66 IN | OXYGEN SATURATION: 95 %

## 2024-02-09 DIAGNOSIS — Z79.01 LONG TERM (CURRENT) USE OF ANTICOAGULANTS: ICD-10-CM

## 2024-02-09 DIAGNOSIS — I10 ESSENTIAL (PRIMARY) HYPERTENSION: ICD-10-CM

## 2024-02-09 DIAGNOSIS — Z79.899 OTHER LONG TERM (CURRENT) DRUG THERAPY: ICD-10-CM

## 2024-02-09 DIAGNOSIS — I48.0 PAROXYSMAL ATRIAL FIBRILLATION: ICD-10-CM

## 2024-02-09 DIAGNOSIS — E78.5 HYPERLIPIDEMIA, UNSPECIFIED: ICD-10-CM

## 2024-02-09 PROCEDURE — G2211 COMPLEX E/M VISIT ADD ON: CPT

## 2024-02-09 PROCEDURE — 99215 OFFICE O/P EST HI 40 MIN: CPT

## 2024-02-09 RX ORDER — RIVAROXABAN 20 MG/1
20 TABLET, FILM COATED ORAL DAILY
Refills: 0 | Status: DISCONTINUED | COMMUNITY
End: 2024-02-09

## 2024-02-09 NOTE — PHYSICAL EXAM
[Murmur] : murmur [Normal] : moves all extremities, no focal deficits, normal speech [de-identified] : No carotid artery bruits auscultated bilaterally [de-identified] : 2/6 systolic apical murmur. [de-identified] : slow gait [de-identified] : mild pitting edema of the bilateral lower extremities.

## 2024-02-09 NOTE — DISCUSSION/SUMMARY
[FreeTextEntry1] : 1. PAF: currently in NSR. Recommend continuing diltiazem CD 180mg daily and Toprol XL 50mg every other day (high risk medication with no signs of toxicity).  Since seeing me last patient had two falls. One he tested positive for COVID-19 infection. He has his Xarelto stopped because the risks outweigh the benefits at this point.   2. Abnormal ECG: patient with RBBB and LAFB. Echocardiogram, 12/28/2023, did not demonstrate structural heart disease.   3. HTN: Goal BP less than 140/90. Recommend low salt diet. Continue diltiazem CD 180mg daily and Toprol XL 50mg every other day. Continue fosinopril 40mg daily.  4. HLD: continue atorvastatin 20mg daily.  Follow up in May 2024.

## 2024-02-09 NOTE — HISTORY OF PRESENT ILLNESS
[FreeTextEntry1] : Historical Perspective: 89 year old male with PMHx of HTN, HLD, DM, PAF (on Xarelto) presents for a cardiac evaluation. Patient was living in [place] but now living on [place] full time. He has no chest pain, dyspnea or palpitations. No history of CVA. No abnormal bleeding on NOAC.  There is no history of MI, CVA, CHF, or previous coronary intervention.  Current Health Status: Since seeing me last patient had two falls. One he tested positive for COVID-19 infection. He has his Xarelto stopped because the risks outweigh the benefits at this point.

## 2024-02-09 NOTE — CARDIOLOGY SUMMARY
[de-identified] : 11/28/2023, Sinus Bradycardia with PACs, RBBB and LAFB [de-identified] : 12/28/2023, LV EF 55-60%, mild MR, mild AI, mild TR with estimated PASP of 69mmHg.

## 2024-04-17 ENCOUNTER — OFFICE (OUTPATIENT)
Dept: URBAN - METROPOLITAN AREA CLINIC 105 | Facility: CLINIC | Age: 89
Setting detail: OPHTHALMOLOGY
End: 2024-04-17
Payer: MEDICARE

## 2024-04-17 DIAGNOSIS — H34.8110: ICD-10-CM

## 2024-04-17 PROBLEM — E11.3213 DM TYPE 2; BOTH MILD WITH ME: Status: ACTIVE | Noted: 2024-04-17

## 2024-04-17 PROCEDURE — 67028 INJECTION EYE DRUG: CPT | Mod: RT | Performed by: OPHTHALMOLOGY

## 2024-04-17 PROCEDURE — 92134 CPTRZ OPH DX IMG PST SGM RTA: CPT | Performed by: OPHTHALMOLOGY

## 2024-05-20 ENCOUNTER — OFFICE (OUTPATIENT)
Dept: URBAN - METROPOLITAN AREA CLINIC 113 | Facility: CLINIC | Age: 89
Setting detail: OPHTHALMOLOGY
End: 2024-05-20
Payer: MEDICARE

## 2024-05-20 DIAGNOSIS — H40.1232: ICD-10-CM

## 2024-05-20 PROCEDURE — 92133 CPTRZD OPH DX IMG PST SGM ON: CPT | Performed by: OPHTHALMOLOGY

## 2024-05-20 PROCEDURE — 99213 OFFICE O/P EST LOW 20 MIN: CPT | Performed by: OPHTHALMOLOGY

## 2024-05-20 PROCEDURE — 92083 EXTENDED VISUAL FIELD XM: CPT | Performed by: OPHTHALMOLOGY

## 2024-05-20 ASSESSMENT — CONFRONTATIONAL VISUAL FIELD TEST (CVF)
OD_FINDINGS: FULL
OS_FINDINGS: FULL

## 2024-05-24 ENCOUNTER — OFFICE (OUTPATIENT)
Dept: URBAN - METROPOLITAN AREA CLINIC 105 | Facility: CLINIC | Age: 89
Setting detail: OPHTHALMOLOGY
End: 2024-05-24
Payer: MEDICARE

## 2024-05-24 DIAGNOSIS — H34.8110: ICD-10-CM

## 2024-05-24 DIAGNOSIS — E11.3213: ICD-10-CM

## 2024-05-24 PROCEDURE — 67028 INJECTION EYE DRUG: CPT | Mod: RT | Performed by: OPHTHALMOLOGY

## 2024-05-24 PROCEDURE — 92134 CPTRZ OPH DX IMG PST SGM RTA: CPT | Performed by: OPHTHALMOLOGY

## 2024-05-24 ASSESSMENT — CONFRONTATIONAL VISUAL FIELD TEST (CVF)
OD_FINDINGS: FULL
OS_FINDINGS: FULL

## 2024-06-13 ENCOUNTER — OFFICE (OUTPATIENT)
Dept: URBAN - METROPOLITAN AREA CLINIC 113 | Facility: CLINIC | Age: 89
Setting detail: OPHTHALMOLOGY
End: 2024-06-13
Payer: MEDICARE

## 2024-06-13 DIAGNOSIS — H40.033: ICD-10-CM

## 2024-06-13 DIAGNOSIS — H40.1232: ICD-10-CM

## 2024-06-13 PROCEDURE — 92250 FUNDUS PHOTOGRAPHY W/I&R: CPT | Performed by: OPHTHALMOLOGY

## 2024-06-13 PROCEDURE — 92012 INTRM OPH EXAM EST PATIENT: CPT | Performed by: OPHTHALMOLOGY

## 2024-06-13 ASSESSMENT — CONFRONTATIONAL VISUAL FIELD TEST (CVF)
OD_FINDINGS: FULL
OS_FINDINGS: FULL

## 2024-06-28 ENCOUNTER — OFFICE (OUTPATIENT)
Dept: URBAN - METROPOLITAN AREA CLINIC 105 | Facility: CLINIC | Age: 89
Setting detail: OPHTHALMOLOGY
End: 2024-06-28
Payer: MEDICARE

## 2024-06-28 DIAGNOSIS — H34.8120: ICD-10-CM

## 2024-06-28 DIAGNOSIS — H34.8110: ICD-10-CM

## 2024-06-28 PROCEDURE — 92134 CPTRZ OPH DX IMG PST SGM RTA: CPT | Performed by: OPHTHALMOLOGY

## 2024-06-28 PROCEDURE — 67028 INJECTION EYE DRUG: CPT | Mod: RT | Performed by: OPHTHALMOLOGY

## 2024-06-28 ASSESSMENT — CONFRONTATIONAL VISUAL FIELD TEST (CVF)
OD_FINDINGS: FULL
OS_FINDINGS: FULL

## 2024-07-09 ENCOUNTER — APPOINTMENT (OUTPATIENT)
Dept: CARDIOLOGY | Facility: CLINIC | Age: 89
End: 2024-07-09
Payer: MEDICARE

## 2024-07-09 ENCOUNTER — NON-APPOINTMENT (OUTPATIENT)
Age: 89
End: 2024-07-09

## 2024-07-09 VITALS
WEIGHT: 150 LBS | SYSTOLIC BLOOD PRESSURE: 152 MMHG | HEART RATE: 58 BPM | BODY MASS INDEX: 24.11 KG/M2 | HEIGHT: 66 IN | OXYGEN SATURATION: 98 % | DIASTOLIC BLOOD PRESSURE: 70 MMHG

## 2024-07-09 DIAGNOSIS — I48.0 PAROXYSMAL ATRIAL FIBRILLATION: ICD-10-CM

## 2024-07-09 DIAGNOSIS — I10 ESSENTIAL (PRIMARY) HYPERTENSION: ICD-10-CM

## 2024-07-09 DIAGNOSIS — R94.31 ABNORMAL ELECTROCARDIOGRAM [ECG] [EKG]: ICD-10-CM

## 2024-07-09 DIAGNOSIS — E78.5 HYPERLIPIDEMIA, UNSPECIFIED: ICD-10-CM

## 2024-07-09 DIAGNOSIS — Z79.899 OTHER LONG TERM (CURRENT) DRUG THERAPY: ICD-10-CM

## 2024-07-09 PROCEDURE — 99215 OFFICE O/P EST HI 40 MIN: CPT

## 2024-07-09 PROCEDURE — G2211 COMPLEX E/M VISIT ADD ON: CPT

## 2024-07-09 PROCEDURE — 93000 ELECTROCARDIOGRAM COMPLETE: CPT

## 2024-07-09 RX ORDER — METOPROLOL SUCCINATE 25 MG/1
25 TABLET, EXTENDED RELEASE ORAL EVERY OTHER DAY
Qty: 45 | Refills: 3 | Status: ACTIVE | COMMUNITY
Start: 2024-07-09

## 2024-08-14 ENCOUNTER — APPOINTMENT (OUTPATIENT)
Dept: CARDIOLOGY | Facility: CLINIC | Age: 89
End: 2024-08-14
Payer: MEDICARE

## 2024-08-14 VITALS
WEIGHT: 145 LBS | BODY MASS INDEX: 21.98 KG/M2 | SYSTOLIC BLOOD PRESSURE: 152 MMHG | DIASTOLIC BLOOD PRESSURE: 72 MMHG | HEIGHT: 68 IN | HEART RATE: 54 BPM | OXYGEN SATURATION: 94 %

## 2024-08-14 DIAGNOSIS — I10 ESSENTIAL (PRIMARY) HYPERTENSION: ICD-10-CM

## 2024-08-14 DIAGNOSIS — R94.31 ABNORMAL ELECTROCARDIOGRAM [ECG] [EKG]: ICD-10-CM

## 2024-08-14 DIAGNOSIS — E78.5 HYPERLIPIDEMIA, UNSPECIFIED: ICD-10-CM

## 2024-08-14 DIAGNOSIS — Z79.899 OTHER LONG TERM (CURRENT) DRUG THERAPY: ICD-10-CM

## 2024-08-14 DIAGNOSIS — I48.0 PAROXYSMAL ATRIAL FIBRILLATION: ICD-10-CM

## 2024-08-14 PROCEDURE — G2211 COMPLEX E/M VISIT ADD ON: CPT

## 2024-08-14 PROCEDURE — 99215 OFFICE O/P EST HI 40 MIN: CPT

## 2024-08-14 NOTE — HISTORY OF PRESENT ILLNESS
[FreeTextEntry1] : Historical Perspective: 89 year old male with PMHx of HTN, HLD, DM, PAF (on Xarelto) presents for a cardiac evaluation. Patient was living in [place] but now living on [place] full time. He has no chest pain, dyspnea or palpitations. No history of CVA. No abnormal bleeding on NOAC.  There is no history of MI, CVA, CHF, or previous coronary intervention.  Current Health Status: Patient with no chest pain, SOB, or palpitations. No hospitalizations since seeing me last. Remains compliant with medications and reports no adverse effects. Patient will be going into [facility] in [place], starting 9/1/2024.

## 2024-08-14 NOTE — DISCUSSION/SUMMARY
[FreeTextEntry1] : 1. PAF: currently in NSR, however given HR of 45 on ECG recommended lowering dose of metoprolol to 25mg daily (patient was on 50mg daily).  HR still on lower side this OV, so we will discontinue Cardizem completely and he can continue Toprol XL 25mg daily (high risk medication with no signs of toxicity).  Patient had two falls in the past. One he tested positive for COVID-19 infection. He has his Xarelto stopped because the risks outweigh the benefits at this point.   2. Abnormal ECG: patient with RBBB and LAFB. Echocardiogram, 12/28/2023, did not demonstrate structural heart disease.   3. HTN: Goal BP less than 140/90. Recommend low salt diet. Continue Toprol XL 25mg daily and fosinopril 40mg daily. D/C diltiazem as described above.  4. HLD: continue atorvastatin 20mg daily.  Follow up in 6 months.

## 2024-08-14 NOTE — CARDIOLOGY SUMMARY
[de-identified] : 7/9/2024, Sinus Bradycardia with first degree AV Block, RBBB and LAFB 11/28/2023, Sinus Bradycardia with PACs, RBBB and LAFB [de-identified] : 12/28/2023, LV EF 55-60%, mild MR, mild AI, mild TR with estimated PASP of 69mmHg.

## 2024-08-14 NOTE — PHYSICAL EXAM
[Murmur] : murmur [Normal] : moves all extremities, no focal deficits, normal speech [de-identified] : No carotid artery bruits auscultated bilaterally [de-identified] : 2/6 systolic apical murmur. [de-identified] : slow gait [de-identified] : mild pitting edema of the bilateral lower extremities.

## 2024-10-11 ENCOUNTER — OFFICE (OUTPATIENT)
Dept: URBAN - METROPOLITAN AREA CLINIC 105 | Facility: CLINIC | Age: 89
Setting detail: OPHTHALMOLOGY
End: 2024-10-11
Payer: MEDICARE

## 2024-10-11 DIAGNOSIS — E11.3213: ICD-10-CM

## 2024-10-11 DIAGNOSIS — H34.8110: ICD-10-CM

## 2024-10-11 DIAGNOSIS — H34.8120: ICD-10-CM

## 2024-10-11 PROCEDURE — 92235 FLUORESCEIN ANGRPH MLTIFRAME: CPT | Performed by: OPHTHALMOLOGY

## 2024-10-11 PROCEDURE — 92134 CPTRZ OPH DX IMG PST SGM RTA: CPT | Performed by: OPHTHALMOLOGY

## 2024-10-11 PROCEDURE — 92012 INTRM OPH EXAM EST PATIENT: CPT | Performed by: OPHTHALMOLOGY

## 2024-10-11 ASSESSMENT — REFRACTION_AUTOREFRACTION
OD_AXIS: 077
OD_CYLINDER: -1.50
OS_CYLINDER: -0.50
OD_SPHERE: +2.25
OS_AXIS: 089
OS_SPHERE: +0.50

## 2024-10-11 ASSESSMENT — REFRACTION_CURRENTRX
OD_CYLINDER: -1.00
OD_SPHERE: +2.00
OD_OVR_VA: 20/
OD_AXIS: 101
OD_OVR_VA: 20/
OS_SPHERE: +1.50
OS_SPHERE: +1.50
OD_VPRISM_DIRECTION: BF
OS_AXIS: 095
OD_VPRISM_DIRECTION: BF
OS_VPRISM_DIRECTION: BF
OD_ADD: +3.25
OD_CYLINDER: -1.00
OD_ADD: +3.25
OS_CYLINDER: -1.75
OS_ADD: +3.25
OS_CYLINDER: -1.75
OS_VPRISM_DIRECTION: BF
OD_AXIS: 104
OS_AXIS: 096
OD_SPHERE: +2.25
OS_ADD: +3.25
OS_OVR_VA: 20/
OS_OVR_VA: 20/

## 2024-10-11 ASSESSMENT — CORNEAL EDEMA CLINICAL DESCRIPTION: OS_CORNEALEDEMA: ABSENT

## 2024-10-11 ASSESSMENT — PACHYMETRY
OS_CT_CORRECTION: -4
OS_CT_UM: 590
OD_CT_CORRECTION: -2
OD_CT_UM: 570

## 2024-10-11 ASSESSMENT — CONFRONTATIONAL VISUAL FIELD TEST (CVF)
OS_FINDINGS: FULL
OD_FINDINGS: FULL

## 2024-10-11 ASSESSMENT — VISUAL ACUITY
OS_BCVA: CF 3FT
OD_BCVA: 20/20

## 2024-10-11 ASSESSMENT — KERATOMETRY
OS_K1POWER_DIOPTERS: 40.00
OD_K2POWER_DIOPTERS: 41.00
OS_K2POWER_DIOPTERS: 41.00
OS_AXISANGLE_DEGREES: 065
OD_AXISANGLE_DEGREES: 173
OD_K1POWER_DIOPTERS: 40.50

## 2024-10-11 ASSESSMENT — TONOMETRY: OS_IOP_MMHG: 19

## 2024-12-23 ENCOUNTER — OFFICE (OUTPATIENT)
Dept: URBAN - METROPOLITAN AREA CLINIC 103 | Facility: CLINIC | Age: 89
Setting detail: OPHTHALMOLOGY
End: 2024-12-23
Payer: MEDICARE

## 2024-12-23 DIAGNOSIS — H34.8110: ICD-10-CM

## 2024-12-23 DIAGNOSIS — H34.8130: ICD-10-CM

## 2024-12-23 PROCEDURE — 92134 CPTRZ OPH DX IMG PST SGM RTA: CPT | Performed by: OPHTHALMOLOGY

## 2024-12-23 PROCEDURE — 67028 INJECTION EYE DRUG: CPT | Mod: RT | Performed by: OPHTHALMOLOGY

## 2024-12-23 ASSESSMENT — KERATOMETRY
OS_K1POWER_DIOPTERS: 40.00
OD_AXISANGLE_DEGREES: 173
OD_K1POWER_DIOPTERS: 40.50
OS_K2POWER_DIOPTERS: 41.00
OD_K2POWER_DIOPTERS: 41.00
OS_AXISANGLE_DEGREES: 065

## 2024-12-23 ASSESSMENT — REFRACTION_CURRENTRX
OD_SPHERE: +2.00
OD_AXIS: 104
OD_SPHERE: +2.25
OD_ADD: +3.25
OD_CYLINDER: -1.00
OS_ADD: +3.25
OS_AXIS: 096
OS_VPRISM_DIRECTION: BF
OS_SPHERE: +1.50
OS_AXIS: 095
OD_OVR_VA: 20/
OS_VPRISM_DIRECTION: BF
OD_AXIS: 101
OD_VPRISM_DIRECTION: BF
OS_CYLINDER: -1.75
OS_OVR_VA: 20/
OS_SPHERE: +1.50
OD_CYLINDER: -1.00
OD_OVR_VA: 20/
OS_CYLINDER: -1.75
OD_VPRISM_DIRECTION: BF
OS_ADD: +3.25
OS_OVR_VA: 20/
OD_ADD: +3.25

## 2024-12-23 ASSESSMENT — REFRACTION_AUTOREFRACTION
OS_CYLINDER: -0.50
OS_AXIS: 089
OD_AXIS: 077
OS_SPHERE: +0.50
OD_CYLINDER: -1.50
OD_SPHERE: +2.25

## 2024-12-23 ASSESSMENT — VISUAL ACUITY
OD_BCVA: 20/25
OS_BCVA: CF 3FT

## 2024-12-23 ASSESSMENT — CONFRONTATIONAL VISUAL FIELD TEST (CVF)
OS_FINDINGS: FULL
OD_FINDINGS: FULL

## 2024-12-23 ASSESSMENT — CORNEAL EDEMA CLINICAL DESCRIPTION: OS_CORNEALEDEMA: ABSENT

## 2025-02-10 ENCOUNTER — OFFICE (OUTPATIENT)
Dept: URBAN - METROPOLITAN AREA CLINIC 105 | Facility: CLINIC | Age: OVER 89
Setting detail: OPHTHALMOLOGY
End: 2025-02-10
Payer: MEDICARE

## 2025-02-10 DIAGNOSIS — H34.8110: ICD-10-CM

## 2025-02-10 DIAGNOSIS — E11.3213: ICD-10-CM

## 2025-02-10 PROCEDURE — 67028 INJECTION EYE DRUG: CPT | Mod: RT | Performed by: OPHTHALMOLOGY

## 2025-02-10 PROCEDURE — 92134 CPTRZ OPH DX IMG PST SGM RTA: CPT | Performed by: OPHTHALMOLOGY

## 2025-02-10 ASSESSMENT — REFRACTION_CURRENTRX
OS_AXIS: 095
OS_OVR_VA: 20/
OS_ADD: +3.25
OD_OVR_VA: 20/
OS_OVR_VA: 20/
OD_OVR_VA: 20/
OS_AXIS: 096
OD_AXIS: 104
OD_CYLINDER: -1.00
OS_VPRISM_DIRECTION: BF
OD_VPRISM_DIRECTION: BF
OD_SPHERE: +2.00
OS_SPHERE: +1.50
OD_ADD: +3.25
OD_SPHERE: +2.25
OD_AXIS: 101
OS_ADD: +3.25
OS_CYLINDER: -1.75
OD_CYLINDER: -1.00
OS_SPHERE: +1.50
OD_ADD: +3.25
OS_CYLINDER: -1.75
OS_VPRISM_DIRECTION: BF
OD_VPRISM_DIRECTION: BF

## 2025-02-10 ASSESSMENT — REFRACTION_AUTOREFRACTION
OS_AXIS: 089
OD_SPHERE: +2.25
OS_SPHERE: +0.50
OD_AXIS: 077
OS_CYLINDER: -0.50
OD_CYLINDER: -1.50

## 2025-02-10 ASSESSMENT — KERATOMETRY
OD_K2POWER_DIOPTERS: 41.00
OS_K1POWER_DIOPTERS: 40.00
OS_K2POWER_DIOPTERS: 41.00
OD_AXISANGLE_DEGREES: 173
OS_AXISANGLE_DEGREES: 065
OD_K1POWER_DIOPTERS: 40.50

## 2025-02-10 ASSESSMENT — CORNEAL EDEMA CLINICAL DESCRIPTION: OS_CORNEALEDEMA: ABSENT

## 2025-02-10 ASSESSMENT — VISUAL ACUITY
OS_BCVA: CF 3FT
OD_BCVA: 20/25+2

## 2025-02-10 ASSESSMENT — CONFRONTATIONAL VISUAL FIELD TEST (CVF)
OS_FINDINGS: FULL
OD_FINDINGS: FULL

## 2025-02-10 ASSESSMENT — PACHYMETRY
OD_CT_UM: 570
OS_CT_CORRECTION: -4
OD_CT_CORRECTION: -2
OS_CT_UM: 590

## 2025-02-10 ASSESSMENT — TONOMETRY
OD_IOP_MMHG: 16
OS_IOP_MMHG: 15

## 2025-02-12 ENCOUNTER — APPOINTMENT (OUTPATIENT)
Dept: CARDIOLOGY | Facility: CLINIC | Age: 89
End: 2025-02-12

## 2025-05-05 ENCOUNTER — OFFICE (OUTPATIENT)
Dept: URBAN - METROPOLITAN AREA CLINIC 103 | Facility: CLINIC | Age: OVER 89
Setting detail: OPHTHALMOLOGY
End: 2025-05-05
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
## 2025-05-05 PROCEDURE — NO SHOW FE NO SHOW FEE: Performed by: OPHTHALMOLOGY

## 2025-05-12 ENCOUNTER — OFFICE (OUTPATIENT)
Dept: URBAN - METROPOLITAN AREA CLINIC 105 | Facility: CLINIC | Age: OVER 89
Setting detail: OPHTHALMOLOGY
End: 2025-05-12
Payer: MEDICARE

## 2025-05-12 DIAGNOSIS — H34.8110: ICD-10-CM

## 2025-05-12 DIAGNOSIS — E11.3213: ICD-10-CM

## 2025-05-12 DIAGNOSIS — H34.8122: ICD-10-CM

## 2025-05-12 PROCEDURE — 92134 CPTRZ OPH DX IMG PST SGM RTA: CPT | Performed by: OPHTHALMOLOGY

## 2025-05-12 PROCEDURE — 67210 TREATMENT OF RETINAL LESION: CPT | Mod: RT | Performed by: OPHTHALMOLOGY

## 2025-05-12 ASSESSMENT — REFRACTION_AUTOREFRACTION
OD_SPHERE: +2.25
OS_CYLINDER: -0.50
OD_CYLINDER: -1.50
OD_AXIS: 077
OS_SPHERE: +0.50
OS_AXIS: 089

## 2025-05-12 ASSESSMENT — REFRACTION_CURRENTRX
OS_OVR_VA: 20/
OS_VPRISM_DIRECTION: BF
OD_AXIS: 104
OS_AXIS: 095
OD_OVR_VA: 20/
OS_ADD: +3.25
OS_VPRISM_DIRECTION: BF
OS_OVR_VA: 20/
OD_OVR_VA: 20/
OD_ADD: +3.25
OS_CYLINDER: -1.75
OD_SPHERE: +2.25
OD_AXIS: 101
OD_CYLINDER: -1.00
OD_CYLINDER: -1.00
OS_CYLINDER: -1.75
OD_VPRISM_DIRECTION: BF
OD_VPRISM_DIRECTION: BF
OS_ADD: +3.25
OS_SPHERE: +1.50
OS_AXIS: 096
OD_ADD: +3.25
OD_SPHERE: +2.00
OS_SPHERE: +1.50

## 2025-05-12 ASSESSMENT — PACHYMETRY
OS_CT_UM: 590
OD_CT_UM: 570
OS_CT_CORRECTION: -4
OD_CT_CORRECTION: -2

## 2025-05-12 ASSESSMENT — KERATOMETRY
OD_AXISANGLE_DEGREES: 173
OS_AXISANGLE_DEGREES: 065
OS_K2POWER_DIOPTERS: 41.00
OS_K1POWER_DIOPTERS: 40.00
OD_K1POWER_DIOPTERS: 40.50
OD_K2POWER_DIOPTERS: 41.00

## 2025-05-12 ASSESSMENT — TONOMETRY
OS_IOP_MMHG: 18
OD_IOP_MMHG: 16

## 2025-05-12 ASSESSMENT — CORNEAL EDEMA CLINICAL DESCRIPTION: OS_CORNEALEDEMA: ABSENT

## 2025-05-12 ASSESSMENT — CONFRONTATIONAL VISUAL FIELD TEST (CVF)
OS_FINDINGS: FULL
OD_FINDINGS: FULL

## 2025-05-12 ASSESSMENT — VISUAL ACUITY
OS_BCVA: HM
OD_BCVA: 20/25

## 2025-07-16 ENCOUNTER — OFFICE (OUTPATIENT)
Dept: URBAN - METROPOLITAN AREA CLINIC 105 | Facility: CLINIC | Age: OVER 89
Setting detail: OPHTHALMOLOGY
End: 2025-07-16
Payer: MEDICARE

## 2025-07-16 DIAGNOSIS — E11.3213: ICD-10-CM

## 2025-07-16 DIAGNOSIS — H34.8110: ICD-10-CM

## 2025-07-16 PROCEDURE — 67028 INJECTION EYE DRUG: CPT | Mod: 58,RT | Performed by: OPHTHALMOLOGY

## 2025-07-16 PROCEDURE — 92134 CPTRZ OPH DX IMG PST SGM RTA: CPT | Performed by: OPHTHALMOLOGY

## 2025-07-16 ASSESSMENT — REFRACTION_AUTOREFRACTION
OS_CYLINDER: -0.50
OS_SPHERE: +0.50
OD_CYLINDER: -1.50
OD_SPHERE: +2.25
OD_AXIS: 077
OS_AXIS: 089

## 2025-07-16 ASSESSMENT — PACHYMETRY
OS_CT_CORRECTION: -4
OD_CT_UM: 570
OS_CT_UM: 590
OD_CT_CORRECTION: -2

## 2025-07-16 ASSESSMENT — VISUAL ACUITY
OS_BCVA: FC 1FT
OD_BCVA: 20/25-

## 2025-07-16 ASSESSMENT — KERATOMETRY
OS_K1POWER_DIOPTERS: 40.00
OS_K2POWER_DIOPTERS: 41.00
OS_AXISANGLE_DEGREES: 065
OD_AXISANGLE_DEGREES: 173
OD_K1POWER_DIOPTERS: 40.50
OD_K2POWER_DIOPTERS: 41.00

## 2025-07-16 ASSESSMENT — TONOMETRY: OS_IOP_MMHG: 16

## 2025-07-16 ASSESSMENT — CONFRONTATIONAL VISUAL FIELD TEST (CVF)
OS_FINDINGS: FULL
OD_FINDINGS: FULL

## 2025-07-16 ASSESSMENT — CORNEAL EDEMA CLINICAL DESCRIPTION: OS_CORNEALEDEMA: ABSENT

## 2025-08-12 ENCOUNTER — APPOINTMENT (OUTPATIENT)
Dept: CARDIOLOGY | Facility: CLINIC | Age: 89
End: 2025-08-12
Payer: MEDICARE

## 2025-08-12 VITALS
OXYGEN SATURATION: 95 % | BODY MASS INDEX: 21.52 KG/M2 | HEART RATE: 68 BPM | DIASTOLIC BLOOD PRESSURE: 80 MMHG | HEIGHT: 68 IN | WEIGHT: 142 LBS | SYSTOLIC BLOOD PRESSURE: 152 MMHG

## 2025-08-12 DIAGNOSIS — E78.5 HYPERLIPIDEMIA, UNSPECIFIED: ICD-10-CM

## 2025-08-12 DIAGNOSIS — I48.0 PAROXYSMAL ATRIAL FIBRILLATION: ICD-10-CM

## 2025-08-12 DIAGNOSIS — I10 ESSENTIAL (PRIMARY) HYPERTENSION: ICD-10-CM

## 2025-08-12 DIAGNOSIS — R94.31 ABNORMAL ELECTROCARDIOGRAM [ECG] [EKG]: ICD-10-CM

## 2025-08-12 PROCEDURE — 93000 ELECTROCARDIOGRAM COMPLETE: CPT

## 2025-08-12 PROCEDURE — 99214 OFFICE O/P EST MOD 30 MIN: CPT

## 2025-08-12 RX ORDER — LEVOBUNOLOL HYDROCHLORIDE 5 MG/ML
0.5 SOLUTION/ DROPS OPHTHALMIC DAILY
Refills: 0 | Status: ACTIVE | COMMUNITY

## 2025-08-12 RX ORDER — METOPROLOL TARTRATE 25 MG/1
25 TABLET ORAL
Refills: 0 | Status: ACTIVE | COMMUNITY

## 2025-08-12 RX ORDER — ONDANSETRON 4 MG/1
4 TABLET ORAL
Refills: 0 | Status: ACTIVE | COMMUNITY

## 2025-08-12 RX ORDER — DILTIAZEM HYDROCHLORIDE 180 MG/1
180 CAPSULE, EXTENDED RELEASE ORAL DAILY
Refills: 0 | Status: ACTIVE | COMMUNITY

## 2025-08-12 RX ORDER — FOSINOPRIL SODIUM 40 MG/1
40 TABLET ORAL DAILY
Refills: 0 | Status: ACTIVE | COMMUNITY